# Patient Record
(demographics unavailable — no encounter records)

---

## 2024-11-18 NOTE — IMAGING
[de-identified] : Lumbar spine: Positive tenderness over the left lumbar paraspinal muscle, negative straight leg raise bilaterally, 5 out of 5 strength lower extremities, 2+ reflexes, full range of motion with flexion with mild discomfort.  X-ray lumbar spine 4 views: No fractures noted.

## 2024-11-18 NOTE — ASSESSMENT
[FreeTextEntry1] : 32-year-old male with left-sided lumbar radiculopathy.  He will continue to undergo chiropractic treatments to alleviate his pain and improve his overall function.  I have prescribed nabumetone 750 mg twice daily as needed pain and he will follow-up in  6 weeks for reassessment.  He is aware if there is any issue and contact the office he verbalized understanding and agreement.

## 2024-11-18 NOTE — HISTORY OF PRESENT ILLNESS
[de-identified] : Mr. Braxton is a 32 year old male presents to the office today for evaluation of lower back pain that began 3 days ago.  He states he was getting into his car and felt pain in the left side of his lower back radiating to his buttock.  He states he cannot sit for long periods of time as the pain shoots down into his thigh.  He did go see a chiropractor on Friday which definitely helped with his pain however he still experiences soreness.  He has been using naproxen however is not providing him with any improvement of his pain.  He denies any loss of motion but there is pain when he moves in certain directions.